# Patient Record
Sex: MALE | Race: BLACK OR AFRICAN AMERICAN | NOT HISPANIC OR LATINO | Employment: FULL TIME | URBAN - METROPOLITAN AREA
[De-identification: names, ages, dates, MRNs, and addresses within clinical notes are randomized per-mention and may not be internally consistent; named-entity substitution may affect disease eponyms.]

---

## 2022-09-26 ENCOUNTER — HOSPITAL ENCOUNTER (EMERGENCY)
Facility: HOSPITAL | Age: 32
Discharge: HOME | End: 2022-09-26
Attending: EMERGENCY MEDICINE
Payer: COMMERCIAL

## 2022-09-26 VITALS
TEMPERATURE: 97 F | BODY MASS INDEX: 37.13 KG/M2 | HEART RATE: 76 BPM | HEIGHT: 68 IN | RESPIRATION RATE: 18 BRPM | DIASTOLIC BLOOD PRESSURE: 92 MMHG | OXYGEN SATURATION: 98 % | SYSTOLIC BLOOD PRESSURE: 139 MMHG | WEIGHT: 245 LBS
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
## 2022-09-26 LAB
ALBUMIN SERPL-MCNC: 4.2 GM/DL (ref 3.5–5)
ALBUMIN/GLOB SERPL: 1.1 RATIO (ref 1.1–2)
ALP SERPL-CCNC: 74 UNIT/L (ref 40–150)
ALT SERPL-CCNC: 28 UNIT/L (ref 0–55)
AST SERPL-CCNC: 22 UNIT/L (ref 5–34)
BASOPHILS # BLD AUTO: 0.06 X10(3)/MCL (ref 0–0.2)
BASOPHILS NFR BLD AUTO: 0.8 %
BILIRUBIN DIRECT+TOT PNL SERPL-MCNC: 0.2 MG/DL
BUN SERPL-MCNC: 13.1 MG/DL (ref 8.9–20.6)
CALCIUM SERPL-MCNC: 10.5 MG/DL (ref 8.4–10.2)
CHLORIDE SERPL-SCNC: 105 MMOL/L (ref 98–107)
CO2 SERPL-SCNC: 27 MMOL/L (ref 22–29)
CREAT SERPL-MCNC: 1.42 MG/DL (ref 0.73–1.18)
EOSINOPHIL # BLD AUTO: 0.12 X10(3)/MCL (ref 0–0.9)
EOSINOPHIL NFR BLD AUTO: 1.6 %
ERYTHROCYTE [DISTWIDTH] IN BLOOD BY AUTOMATED COUNT: 14.9 % (ref 11.5–17)
GFR SERPLBLD CREATININE-BSD FMLA CKD-EPI: >60 MLS/MIN/1.73/M2
GLOBULIN SER-MCNC: 4 GM/DL (ref 2.4–3.5)
GLUCOSE SERPL-MCNC: 101 MG/DL (ref 74–100)
HBV CORE IGM SERPL QL IA: NONREACTIVE
HBV SURFACE AB SER-ACNC: 73.46 MIU/ML
HBV SURFACE AB SERPL IA-ACNC: REACTIVE M[IU]/ML
HCT VFR BLD AUTO: 45.9 % (ref 42–52)
HGB BLD-MCNC: 14.4 GM/DL (ref 14–18)
HIV 1+2 AB+HIV1 P24 AG SERPL QL IA: NONREACTIVE
IMM GRANULOCYTES # BLD AUTO: 0.07 X10(3)/MCL (ref 0–0.04)
IMM GRANULOCYTES NFR BLD AUTO: 0.9 %
LYMPHOCYTES # BLD AUTO: 3.45 X10(3)/MCL (ref 0.6–4.6)
LYMPHOCYTES NFR BLD AUTO: 46.1 %
MCH RBC QN AUTO: 26 PG (ref 27–31)
MCHC RBC AUTO-ENTMCNC: 31.4 MG/DL (ref 33–36)
MCV RBC AUTO: 83 FL (ref 80–94)
MONOCYTES # BLD AUTO: 0.5 X10(3)/MCL (ref 0.1–1.3)
MONOCYTES NFR BLD AUTO: 6.7 %
NEUTROPHILS # BLD AUTO: 3.3 X10(3)/MCL (ref 2.1–9.2)
NEUTROPHILS NFR BLD AUTO: 43.9 %
NRBC BLD AUTO-RTO: 0 %
PLATELET # BLD AUTO: 349 X10(3)/MCL (ref 130–400)
PMV BLD AUTO: 9.5 FL (ref 7.4–10.4)
POTASSIUM SERPL-SCNC: 4.2 MMOL/L (ref 3.5–5.1)
PROT SERPL-MCNC: 8.2 GM/DL (ref 6.4–8.3)
RBC # BLD AUTO: 5.53 X10(6)/MCL (ref 4.7–6.1)
SODIUM SERPL-SCNC: 145 MMOL/L (ref 136–145)
WBC # SPEC AUTO: 7.5 X10(3)/MCL (ref 4.5–11.5)

## 2022-09-26 PROCEDURE — 86803 HEPATITIS C AB TEST: CPT | Performed by: NURSE PRACTITIONER

## 2022-09-26 PROCEDURE — 99284 EMERGENCY DEPT VISIT MOD MDM: CPT | Mod: 25

## 2022-09-26 PROCEDURE — 86709 HEPATITIS A IGM ANTIBODY: CPT | Performed by: NURSE PRACTITIONER

## 2022-09-26 PROCEDURE — 90471 IMMUNIZATION ADMIN: CPT | Performed by: NURSE PRACTITIONER

## 2022-09-26 PROCEDURE — 80053 COMPREHEN METABOLIC PANEL: CPT | Performed by: NURSE PRACTITIONER

## 2022-09-26 PROCEDURE — 85025 COMPLETE CBC W/AUTO DIFF WBC: CPT | Performed by: NURSE PRACTITIONER

## 2022-09-26 PROCEDURE — 87389 HIV-1 AG W/HIV-1&-2 AB AG IA: CPT | Performed by: NURSE PRACTITIONER

## 2022-09-26 PROCEDURE — 63600175 PHARM REV CODE 636 W HCPCS: Performed by: NURSE PRACTITIONER

## 2022-09-26 PROCEDURE — 90715 TDAP VACCINE 7 YRS/> IM: CPT | Performed by: NURSE PRACTITIONER

## 2022-09-26 PROCEDURE — 86706 HEP B SURFACE ANTIBODY: CPT | Performed by: NURSE PRACTITIONER

## 2022-09-26 PROCEDURE — 36415 COLL VENOUS BLD VENIPUNCTURE: CPT | Performed by: NURSE PRACTITIONER

## 2022-09-26 RX ADMIN — TETANUS TOXOID, REDUCED DIPHTHERIA TOXOID AND ACELLULAR PERTUSSIS VACCINE, ADSORBED 0.5 ML: 5; 2.5; 8; 8; 2.5 SUSPENSION INTRAMUSCULAR at 12:09

## 2022-09-29 LAB — HCV AB SERPL QL IA: NONREACTIVE

## 2024-06-26 ENCOUNTER — OFFICE VISIT (OUTPATIENT)
Dept: FAMILY MEDICINE | Facility: CLINIC | Age: 34
End: 2024-06-26
Payer: COMMERCIAL

## 2024-06-26 ENCOUNTER — PATIENT MESSAGE (OUTPATIENT)
Dept: FAMILY MEDICINE | Facility: CLINIC | Age: 34
End: 2024-06-26

## 2024-06-26 VITALS
WEIGHT: 281.69 LBS | OXYGEN SATURATION: 98 % | DIASTOLIC BLOOD PRESSURE: 84 MMHG | RESPIRATION RATE: 18 BRPM | TEMPERATURE: 98 F | SYSTOLIC BLOOD PRESSURE: 121 MMHG | BODY MASS INDEX: 42.69 KG/M2 | HEIGHT: 68 IN | HEART RATE: 75 BPM

## 2024-06-26 DIAGNOSIS — Z00.00 ENCOUNTER FOR WELLNESS EXAMINATION: Primary | ICD-10-CM

## 2024-06-26 DIAGNOSIS — R09.81 NASAL CONGESTION: ICD-10-CM

## 2024-06-26 DIAGNOSIS — G47.10 HYPERSOMNIA: ICD-10-CM

## 2024-06-26 LAB
ALBUMIN SERPL-MCNC: 4 G/DL (ref 3.5–5)
ALBUMIN/GLOB SERPL: 1.1 RATIO (ref 1.1–2)
ALP SERPL-CCNC: 64 UNIT/L (ref 40–150)
ALT SERPL-CCNC: 36 UNIT/L (ref 0–55)
ANION GAP SERPL CALC-SCNC: 8 MEQ/L
AST SERPL-CCNC: 27 UNIT/L (ref 5–34)
BACTERIA #/AREA URNS AUTO: ABNORMAL /HPF
BASOPHILS # BLD AUTO: 0.08 X10(3)/MCL
BASOPHILS NFR BLD AUTO: 1 %
BILIRUB SERPL-MCNC: 0.3 MG/DL
BILIRUB UR QL STRIP.AUTO: NEGATIVE
BUN SERPL-MCNC: 10.6 MG/DL (ref 8.9–20.6)
CALCIUM SERPL-MCNC: 9.9 MG/DL (ref 8.4–10.2)
CHLORIDE SERPL-SCNC: 107 MMOL/L (ref 98–107)
CHOLEST SERPL-MCNC: 252 MG/DL
CHOLEST/HDLC SERPL: 7 {RATIO} (ref 0–5)
CLARITY UR: CLEAR
CO2 SERPL-SCNC: 28 MMOL/L (ref 22–29)
COLOR UR AUTO: ABNORMAL
CREAT SERPL-MCNC: 1.44 MG/DL (ref 0.73–1.18)
CREAT/UREA NIT SERPL: 7
EOSINOPHIL # BLD AUTO: 0.15 X10(3)/MCL (ref 0–0.9)
EOSINOPHIL NFR BLD AUTO: 1.8 %
ERYTHROCYTE [DISTWIDTH] IN BLOOD BY AUTOMATED COUNT: 14.8 % (ref 11.5–17)
EST. AVERAGE GLUCOSE BLD GHB EST-MCNC: 122.6 MG/DL
GFR SERPLBLD CREATININE-BSD FMLA CKD-EPI: >60 ML/MIN/1.73/M2
GLOBULIN SER-MCNC: 3.7 GM/DL (ref 2.4–3.5)
GLUCOSE SERPL-MCNC: 92 MG/DL (ref 74–100)
GLUCOSE UR QL STRIP: NORMAL
HBA1C MFR BLD: 5.9 %
HCT VFR BLD AUTO: 43.1 % (ref 42–52)
HDLC SERPL-MCNC: 38 MG/DL (ref 35–60)
HGB BLD-MCNC: 14.2 G/DL (ref 14–18)
HGB UR QL STRIP: NEGATIVE
HYALINE CASTS #/AREA URNS LPF: ABNORMAL /LPF
IMM GRANULOCYTES # BLD AUTO: 0.15 X10(3)/MCL (ref 0–0.04)
IMM GRANULOCYTES NFR BLD AUTO: 1.8 %
KETONES UR QL STRIP: NEGATIVE
LDLC SERPL CALC-MCNC: 188 MG/DL (ref 50–140)
LEUKOCYTE ESTERASE UR QL STRIP: NEGATIVE
LYMPHOCYTES # BLD AUTO: 3.44 X10(3)/MCL (ref 0.6–4.6)
LYMPHOCYTES NFR BLD AUTO: 40.9 %
MCH RBC QN AUTO: 27.1 PG (ref 27–31)
MCHC RBC AUTO-ENTMCNC: 32.9 G/DL (ref 33–36)
MCV RBC AUTO: 82.3 FL (ref 80–94)
MONOCYTES # BLD AUTO: 0.64 X10(3)/MCL (ref 0.1–1.3)
MONOCYTES NFR BLD AUTO: 7.6 %
MUCOUS THREADS URNS QL MICRO: ABNORMAL /LPF
NEUTROPHILS # BLD AUTO: 3.95 X10(3)/MCL (ref 2.1–9.2)
NEUTROPHILS NFR BLD AUTO: 46.9 %
NITRITE UR QL STRIP: NEGATIVE
NRBC BLD AUTO-RTO: 0 %
PH UR STRIP: 6 [PH]
PLATELET # BLD AUTO: 324 X10(3)/MCL (ref 130–400)
PMV BLD AUTO: 9.7 FL (ref 7.4–10.4)
POTASSIUM SERPL-SCNC: 3.8 MMOL/L (ref 3.5–5.1)
PROT SERPL-MCNC: 7.7 GM/DL (ref 6.4–8.3)
PROT UR QL STRIP: NEGATIVE
RBC # BLD AUTO: 5.24 X10(6)/MCL (ref 4.7–6.1)
RBC #/AREA URNS AUTO: ABNORMAL /HPF
SODIUM SERPL-SCNC: 143 MMOL/L (ref 136–145)
SP GR UR STRIP.AUTO: 1.02 (ref 1–1.03)
SQUAMOUS #/AREA URNS LPF: ABNORMAL /HPF
T4 FREE SERPL-MCNC: 0.86 NG/DL (ref 0.7–1.48)
TRIGL SERPL-MCNC: 128 MG/DL (ref 34–140)
TSH SERPL-ACNC: 1.2 UIU/ML (ref 0.35–4.94)
UROBILINOGEN UR STRIP-ACNC: NORMAL
VLDLC SERPL CALC-MCNC: 26 MG/DL
WBC # BLD AUTO: 8.41 X10(3)/MCL (ref 4.5–11.5)
WBC #/AREA URNS AUTO: ABNORMAL /HPF

## 2024-06-26 PROCEDURE — 80061 LIPID PANEL: CPT

## 2024-06-26 PROCEDURE — 80053 COMPREHEN METABOLIC PANEL: CPT

## 2024-06-26 PROCEDURE — 99385 PREV VISIT NEW AGE 18-39: CPT | Mod: S$PBB,,,

## 2024-06-26 PROCEDURE — 84443 ASSAY THYROID STIM HORMONE: CPT

## 2024-06-26 PROCEDURE — 3008F BODY MASS INDEX DOCD: CPT | Mod: CPTII,,,

## 2024-06-26 PROCEDURE — 99213 OFFICE O/P EST LOW 20 MIN: CPT | Mod: PBBFAC,PN

## 2024-06-26 PROCEDURE — 85025 COMPLETE CBC W/AUTO DIFF WBC: CPT

## 2024-06-26 PROCEDURE — 84439 ASSAY OF FREE THYROXINE: CPT

## 2024-06-26 PROCEDURE — 1160F RVW MEDS BY RX/DR IN RCRD: CPT | Mod: CPTII,,,

## 2024-06-26 PROCEDURE — 1159F MED LIST DOCD IN RCRD: CPT | Mod: CPTII,,,

## 2024-06-26 PROCEDURE — 3079F DIAST BP 80-89 MM HG: CPT | Mod: CPTII,,,

## 2024-06-26 PROCEDURE — 81001 URINALYSIS AUTO W/SCOPE: CPT

## 2024-06-26 PROCEDURE — 36415 COLL VENOUS BLD VENIPUNCTURE: CPT

## 2024-06-26 PROCEDURE — 3074F SYST BP LT 130 MM HG: CPT | Mod: CPTII,,,

## 2024-06-26 PROCEDURE — 83036 HEMOGLOBIN GLYCOSYLATED A1C: CPT

## 2024-06-26 RX ORDER — FLUTICASONE PROPIONATE 50 MCG
1 SPRAY, SUSPENSION (ML) NASAL DAILY
Qty: 18.2 ML | Refills: 3 | Status: SHIPPED | OUTPATIENT
Start: 2024-06-26

## 2024-06-26 NOTE — PROGRESS NOTES
Patient Name: Faraz Sanchez     : 1990    MRN: [id number]     Subjective:     Patient ID: Faraz Sanchez is a 34 y.o. male.    Chief Complaint:   Chief Complaint   Patient presents with    Establish Care     Check up; girlfriend and mom wants him to get checked for diabetes        HPI: 2024:  Since the clinic today to establish care, states that he has not seen any provider in about 20 years.  Patient does not have any chronic conditions currently and does not take any daily medications that need to be refilled.  Patient states his wife and girlfriend want him to get checked for diabetes.  Patient also complains of witnessed apnea and snoring at night.  States his girlfriend tells him that she has to wake him up because he was not breathing.  Patient endorses daytime fatigue, epworth 15.     Patient denies chest pain, palpitations, and shortness of breath.  Patient denies fever, night sweats, chills, nausea, vomiting, diarrhea, constipation, weight loss, and changes in appetite.            ROS:       12 point review of systems conducted, negative except as stated in the history of present illness. See HPI for details.    History:     History reviewed. No pertinent past medical history.     Past Surgical History:   Procedure Laterality Date    ANKLE SURGERY Right 2019       Family History   Problem Relation Name Age of Onset    Hearing loss Mother      Hypertension Mother      Stroke Mother      Diabetes Mother          Social History     Tobacco Use    Smoking status: Never    Smokeless tobacco: Never   Substance and Sexual Activity    Alcohol use: Yes     Comment: ocassionally;socially    Drug use: Never    Sexual activity: Yes     Partners: Female       Current Outpatient Medications   Medication Instructions    fluticasone propionate (FLONASE) 50 mcg, Each Nostril, Daily        Review of patient's allergies indicates:  No Known Allergies    Objective:     Visit Vitals  /84 (BP Location: Right  "arm, Patient Position: Sitting, BP Method: Large (Automatic))   Pulse 75   Temp 98 °F (36.7 °C) (Oral)   Resp 18   Ht 5' 8" (1.727 m)   Wt 127.8 kg (281 lb 11.2 oz)   SpO2 98%   BMI 42.83 kg/m²       Physical Examination:     Physical Exam  Constitutional:       General: He is not in acute distress.     Appearance: Normal appearance. He is not ill-appearing.   HENT:      Nose: Congestion present.   Cardiovascular:      Rate and Rhythm: Normal rate and regular rhythm.      Heart sounds: Normal heart sounds.   Pulmonary:      Effort: Pulmonary effort is normal. No respiratory distress.      Breath sounds: Normal breath sounds.   Musculoskeletal:      Cervical back: Normal range of motion.   Skin:     General: Skin is warm and dry.   Neurological:      Mental Status: He is alert and oriented to person, place, and time.   Psychiatric:         Mood and Affect: Mood normal.         Behavior: Behavior normal.         Lab Results:        Assessment:          ICD-10-CM ICD-9-CM   1. Encounter for wellness examination  Z00.00 V70.0   2. Nasal congestion  R09.81 478.19   3. Hypersomnia  G47.10 780.54        Plan:     1. Encounter for wellness examination  Comments:  Wellness labs ordered for patient today  Orders:  -     TSH  -     T4, Free  -     Hemoglobin A1C  -     Lipid Panel  -     CBC Auto Differential  -     Comprehensive Metabolic Panel  -     Urinalysis, Reflex to Urine Culture    2. Nasal congestion  Comments:  Flonase 50 mcg in each nostril once daily  Orders:  -     fluticasone propionate (FLONASE) 50 mcg/actuation nasal spray; 1 spray (50 mcg total) by Each Nostril route once daily.  Dispense: 18.2 mL; Refill: 3    3. Hypersomnia  Comments:  Referral for sleep study placed today.         No follow-ups on file.    Future Appointments   Date Time Provider Department Center   7/11/2024  1:15 PM [facility]        Kita Sexton NP        "

## 2024-07-11 ENCOUNTER — OFFICE VISIT (OUTPATIENT)
Dept: FAMILY MEDICINE | Facility: CLINIC | Age: 34
End: 2024-07-11
Payer: COMMERCIAL

## 2024-07-11 ENCOUNTER — PATIENT MESSAGE (OUTPATIENT)
Dept: FAMILY MEDICINE | Facility: CLINIC | Age: 34
End: 2024-07-11

## 2024-07-11 DIAGNOSIS — R73.03 PREDIABETES: Primary | ICD-10-CM

## 2024-07-11 DIAGNOSIS — E78.89 LIPIDS ABNORMAL: ICD-10-CM

## 2024-07-11 PROCEDURE — 99499 UNLISTED E&M SERVICE: CPT | Mod: 95,,,

## 2024-07-11 NOTE — ASSESSMENT & PLAN NOTE
The prediabetic range is 5.7 - 6.4. They will need to follow an ADA diet, avoiding soda, simple sweets, and limit rice/pasta/breads/starches.  Maintain healthy weight with a goal BMI less than 30.  Exercise 5 times per week for 30 minutes/day.  We will continue to monitor this lab and will recheck it in the future

## 2024-07-11 NOTE — PROGRESS NOTES
Audio/visual Telehealth Visit     The patient location is:  [place]  The chief complaint leading to consultation is: review labs  Visit type:  Synchronous audio visual  Total time spent with patient: 15 min     Each patient to whom I provide medical services by telemedicine is:  (1) informed of the relationship between the physician and patient and the respective role of any other health care provider with respect to management of the patient; and (2) notified that they may decline to receive medical services by telemedicine and may withdraw from such care at any time. Patient verbally consented to receive this service via audio/visual call.      Patient Name: Faraz Sanchez   : 1990  MRN: [id number]     Subjective:   Patient ID: Faraz Sanchez is a 34 y.o. male.    Chief Complaint: review labs     HPI: 2024: Patient presents to clinic today to review labs. Discussed labs at length with patient and all questions were answered to patients understanding. Patient has no new acute complaints today.  Patient states that he has not heard yet from sleep study referral that was placed at initial office visit, he does have phone number to this referral and will contact them directly.          2024:  Since the clinic today to establish care, states that he has not seen any provider in about 20 years.  Patient does not have any chronic conditions currently and does not take any daily medications that need to be refilled.  Patient states his wife and girlfriend want him to get checked for diabetes.  Patient also complains of witnessed apnea and snoring at night.  States his girlfriend tells him that she has to wake him up because he was not breathing.  Patient endorses daytime fatigue, epworth 15.     Patient denies chest pain, palpitations, and shortness of breath.  Patient denies fever, night sweats, chills, nausea, vomiting, diarrhea, constipation, weight loss, and changes in  appetite.            ROS:    12 point review of systems conducted, negative except as stated in the history of present illness. See HPI for details.      History:   History reviewed. No pertinent past medical history.   Past Surgical History:   Procedure Laterality Date    ANKLE SURGERY Right 2019     Family History   Problem Relation Name Age of Onset    Hearing loss Mother      Hypertension Mother      Stroke Mother      Diabetes Mother        Social History     Tobacco Use    Smoking status: Never    Smokeless tobacco: Never   Substance and Sexual Activity    Alcohol use: Yes     Comment: ocassionally;socially    Drug use: Never    Sexual activity: Yes     Partners: Female        Allergies: Review of patient's allergies indicates:  No Known Allergies  Objective:   There were no vitals filed for this visit.  There is no height or weight on file to calculate BMI.     Physical Examination:   Physical Exam  Constitutional:       General: He is not in acute distress.     Comments: Limited Physical Exam due to telemedicine visit   Pulmonary:      Effort: Pulmonary effort is normal. No respiratory distress.   Neurological:      Mental Status: He is alert.   Psychiatric:         Mood and Affect: Mood normal.         Behavior: Behavior normal.         Assessment:     Problem List Items Addressed This Visit       Prediabetes - Primary    Current Assessment & Plan     The prediabetic range is 5.7 - 6.4. They will need to follow an ADA diet, avoiding soda, simple sweets, and limit rice/pasta/breads/starches.  Maintain healthy weight with a goal BMI less than 30.  Exercise 5 times per week for 30 minutes/day.  We will continue to monitor this lab and will recheck it in the future           Lipids abnormal    Current Assessment & Plan     Stressed importance of dietary modifications. Follow a low cholesterol, low saturated fat diet with less that 200mg of cholesterol a day.  Avoid fried foods and high saturated fats (high  saturated fats less than 7% of calories).  Add Flax Seed/Fish Oil supplements to diet. Increase dietary fiber.  Regular exercise can reduce LDL and raise HDL. Stressed importance of physical activity 5 times per week for 30 minutes per day.                 Plan:   Diagnoses and all orders for this visit:    Prediabetes    Lipids abnormal       Follow up in about 1 year (around 7/11/2025), or if symptoms worsen or fail to improve, for annual wellness exam.        **Due to technological air patient was disconnected from visits, attempted to call patient's cell phone and left a voicemail for him to contact clinic again.**    This note was created with the assistance of a voice recognition software or phone dictation. There may be transcription errors as a result of using this technology however minimal. Effort has been made to assure accuracy of transcription but any obvious errors or omissions should be clarified with the author of the document      This service was not originating from a related E/M service provided within the previous 7 days nor will  to an E/M service or procedure within the next 24 hours or my soonest available appointment.  Prevailing standard of care was able to be met in this audio-visual visit.

## 2025-04-21 NOTE — ED PROVIDER NOTES
Chief Complaint   Patient presents with    Medication Refill     Last Appointment with Dr. Aparna Cortez:  3/13/2025   Future Appointments   Date Time Provider Department Center   6/23/2025 10:00 AM [facility]   9/18/2025  1:00 PM Aparna Cortez MD [facility] DEP       The above orders were approved via [facility] per Dr. Aparna Cortez.     Encounter Date: 9/26/2022       History     Chief Complaint   Patient presents with    Body Fluid Exposure     Employee needle stick exposure, to the left palm after he states there were needles in the wrong bin.      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 States that he washed the area immediately.     Review of patient's allergies indicates:  No Known Allergies  No past medical history on file.  No past surgical history on file.  No family history on file.  Social History     Tobacco Use    Smoking status: Never    Smokeless tobacco: Never     Review of Systems   Constitutional: Negative.    HENT: Negative.     Eyes: Negative.    Respiratory: Negative.     Cardiovascular: Negative.    Gastrointestinal: Negative.    Endocrine: Negative.    Genitourinary: Negative.    Musculoskeletal: Negative.    Skin: Negative.         Needlestick    Allergic/Immunologic: Negative.    Neurological: Negative.    Hematological: Negative.    Psychiatric/Behavioral: Negative.     All other systems reviewed and are negative.    Physical Exam     Initial Vitals [09/26/22 1220]   BP Pulse Resp Temp SpO2   (!) 139/92 76 18 97.3 °F (36.3 °C) 98 %      MAP       --         Physical Exam    Nursing note and vitals reviewed.  Constitutional: He appears well-developed and well-nourished. No distress.   HENT:   Head: Normocephalic and atraumatic.   Mouth/Throat: Oropharynx is clear and moist.   Eyes: Conjunctivae and EOM are normal. Pupils are equal, round, and reactive to light.   Neck: Neck supple.   Normal range of motion.  Cardiovascular:  Normal rate, regular rhythm, normal heart sounds and intact distal pulses.           Pulmonary/Chest: Breath sounds normal. No respiratory distress.   Musculoskeletal:         General: No edema. Normal range of motion.      Cervical back: Normal range of motion and neck supple.     Neurological: He is alert and oriented to person,  place, and time. He has normal strength. GCS score is 15. GCS eye subscore is 4. GCS verbal subscore is 5. GCS motor subscore is 6.   Skin: Skin is warm and dry. No rash noted.   Left lateral palm with small needle puncture wound. There is no bleeding or drainage noted.    Psychiatric: He has a normal mood and affect. Thought content normal.       ED Course   Procedures  Labs Reviewed   HIV 1 / 2 ANTIBODY   HEPATITIS B SURFACE ANTIBODY   HEPATITIS B CORE ANTIBODY, IGM   HEPATITIS C ANTIBODY   CBC W/ AUTO DIFFERENTIAL    Narrative:     The following orders were created for panel order CBC auto differential.  Procedure                               Abnormality         Status                     ---------                               -----------         ------                     CBC with Differential[[id number]]                                                         Please view results for these tests on the individual orders.   COMPREHENSIVE METABOLIC PANEL   CBC WITH DIFFERENTIAL          Imaging Results    None          Medications   Tdap (BOOSTRIX) vaccine injection 0.5 mL (has no administration in time range)     Medical Decision Making:   Initial Assessment:   Patient is awake, alert, neurovascular intact, and ambulatory in the ED.   Differential Diagnosis:   Needlestick, Exposure to body fluids  Clinical Tests:   Lab Tests: Ordered  ED Management:  Offered HIV prophylaxis to patient and discussed risks vs. Benefits of initiating prophylaxis and patient declined initiating treatment at this time.                           Clinical Impression:   Final diagnoses:  [W46.1XXA] Needlestick injury accident with exposure to body fluid (Primary)        ED Disposition Condition    Discharge Stable          ED Prescriptions    None       Follow-up Information       Follow up With Specialties Details Why Contact Info    Primary Care Provider  In 3 days      [facility]  In 3 days               Karina HARRISON  Leonardo, [facility]  09/26/22 1240

## 2025-06-09 ENCOUNTER — OFFICE VISIT (OUTPATIENT)
Dept: FAMILY MEDICINE | Facility: CLINIC | Age: 35
End: 2025-06-09
Payer: COMMERCIAL

## 2025-06-09 VITALS
TEMPERATURE: 98 F | DIASTOLIC BLOOD PRESSURE: 88 MMHG | OXYGEN SATURATION: 98 % | HEIGHT: 68 IN | RESPIRATION RATE: 18 BRPM | BODY MASS INDEX: 42.54 KG/M2 | HEART RATE: 75 BPM | SYSTOLIC BLOOD PRESSURE: 130 MMHG | WEIGHT: 280.69 LBS

## 2025-06-09 DIAGNOSIS — E78.89 LIPIDS ABNORMAL: ICD-10-CM

## 2025-06-09 DIAGNOSIS — E66.01 CLASS 3 SEVERE OBESITY DUE TO EXCESS CALORIES WITHOUT SERIOUS COMORBIDITY WITH BODY MASS INDEX (BMI) OF 40.0 TO 44.9 IN ADULT: ICD-10-CM

## 2025-06-09 DIAGNOSIS — Z00.00 WELL ADULT EXAM: Primary | ICD-10-CM

## 2025-06-09 DIAGNOSIS — E66.813 CLASS 3 SEVERE OBESITY DUE TO EXCESS CALORIES WITHOUT SERIOUS COMORBIDITY WITH BODY MASS INDEX (BMI) OF 40.0 TO 44.9 IN ADULT: ICD-10-CM

## 2025-06-09 DIAGNOSIS — R73.03 PREDIABETES: ICD-10-CM

## 2025-06-09 LAB
25(OH)D3+25(OH)D2 SERPL-MCNC: 20 NG/ML (ref 30–80)
ALBUMIN SERPL-MCNC: 4 G/DL (ref 3.5–5)
ALBUMIN/GLOB SERPL: 1 RATIO (ref 1.1–2)
ALP SERPL-CCNC: 73 UNIT/L (ref 40–150)
ALT SERPL-CCNC: 25 UNIT/L (ref 0–55)
ANION GAP SERPL CALC-SCNC: 8 MEQ/L
AST SERPL-CCNC: 20 UNIT/L (ref 11–45)
BACTERIA #/AREA URNS AUTO: ABNORMAL /HPF
BASOPHILS # BLD AUTO: 0.09 X10(3)/MCL
BASOPHILS NFR BLD AUTO: 1 %
BILIRUB SERPL-MCNC: 0.3 MG/DL
BILIRUB UR QL STRIP.AUTO: NEGATIVE
BUN SERPL-MCNC: 13.8 MG/DL (ref 8.9–20.6)
CALCIUM SERPL-MCNC: 9.8 MG/DL (ref 8.4–10.2)
CHLORIDE SERPL-SCNC: 107 MMOL/L (ref 98–107)
CHOLEST SERPL-MCNC: 266 MG/DL
CHOLEST/HDLC SERPL: 7 {RATIO} (ref 0–5)
CLARITY UR: CLEAR
CO2 SERPL-SCNC: 26 MMOL/L (ref 22–29)
COLOR UR AUTO: ABNORMAL
CREAT SERPL-MCNC: 1.26 MG/DL (ref 0.72–1.25)
CREAT/UREA NIT SERPL: 11
EOSINOPHIL # BLD AUTO: 0.08 X10(3)/MCL (ref 0–0.9)
EOSINOPHIL NFR BLD AUTO: 0.9 %
ERYTHROCYTE [DISTWIDTH] IN BLOOD BY AUTOMATED COUNT: 14.6 % (ref 11.5–17)
EST. AVERAGE GLUCOSE BLD GHB EST-MCNC: 125.5 MG/DL
GFR SERPLBLD CREATININE-BSD FMLA CKD-EPI: >60 ML/MIN/1.73/M2
GLOBULIN SER-MCNC: 4.1 GM/DL (ref 2.4–3.5)
GLUCOSE SERPL-MCNC: 86 MG/DL (ref 74–100)
GLUCOSE UR QL STRIP: NORMAL
HBA1C MFR BLD: 6 %
HCT VFR BLD AUTO: 45.4 % (ref 42–52)
HDLC SERPL-MCNC: 37 MG/DL (ref 35–60)
HGB BLD-MCNC: 14.4 G/DL (ref 14–18)
HGB UR QL STRIP: ABNORMAL
HYALINE CASTS #/AREA URNS LPF: ABNORMAL /LPF
IMM GRANULOCYTES # BLD AUTO: 0.17 X10(3)/MCL (ref 0–0.04)
IMM GRANULOCYTES NFR BLD AUTO: 1.9 %
KETONES UR QL STRIP: NEGATIVE
LDLC SERPL CALC-MCNC: 190 MG/DL (ref 50–140)
LEUKOCYTE ESTERASE UR QL STRIP: NEGATIVE
LYMPHOCYTES # BLD AUTO: 3.45 X10(3)/MCL (ref 0.6–4.6)
LYMPHOCYTES NFR BLD AUTO: 39.3 %
MCH RBC QN AUTO: 25.8 PG (ref 27–31)
MCHC RBC AUTO-ENTMCNC: 31.7 G/DL (ref 33–36)
MCV RBC AUTO: 81.2 FL (ref 80–94)
MONOCYTES # BLD AUTO: 0.8 X10(3)/MCL (ref 0.1–1.3)
MONOCYTES NFR BLD AUTO: 9.1 %
MUCOUS THREADS URNS QL MICRO: ABNORMAL /LPF
NEUTROPHILS # BLD AUTO: 4.18 X10(3)/MCL (ref 2.1–9.2)
NEUTROPHILS NFR BLD AUTO: 47.8 %
NITRITE UR QL STRIP: NEGATIVE
NRBC BLD AUTO-RTO: 0 %
PH UR STRIP: 6 [PH]
PLATELET # BLD AUTO: 335 X10(3)/MCL (ref 130–400)
PMV BLD AUTO: 9.5 FL (ref 7.4–10.4)
POTASSIUM SERPL-SCNC: 4.2 MMOL/L (ref 3.5–5.1)
PROT SERPL-MCNC: 8.1 GM/DL (ref 6.4–8.3)
PROT UR QL STRIP: NEGATIVE
RBC # BLD AUTO: 5.59 X10(6)/MCL (ref 4.7–6.1)
RBC #/AREA URNS AUTO: ABNORMAL /HPF
SODIUM SERPL-SCNC: 141 MMOL/L (ref 136–145)
SP GR UR STRIP.AUTO: 1.02 (ref 1–1.03)
SQUAMOUS #/AREA URNS LPF: ABNORMAL /HPF
TRIGL SERPL-MCNC: 195 MG/DL (ref 34–140)
TSH SERPL-ACNC: 1.83 UIU/ML (ref 0.35–4.94)
UROBILINOGEN UR STRIP-ACNC: NORMAL
VLDLC SERPL CALC-MCNC: 39 MG/DL
WBC # BLD AUTO: 8.77 X10(3)/MCL (ref 4.5–11.5)
WBC #/AREA URNS AUTO: ABNORMAL /HPF

## 2025-06-09 PROCEDURE — 83036 HEMOGLOBIN GLYCOSYLATED A1C: CPT | Performed by: NURSE PRACTITIONER

## 2025-06-09 PROCEDURE — 80053 COMPREHEN METABOLIC PANEL: CPT | Performed by: NURSE PRACTITIONER

## 2025-06-09 PROCEDURE — 99214 OFFICE O/P EST MOD 30 MIN: CPT | Mod: PBBFAC,PN | Performed by: NURSE PRACTITIONER

## 2025-06-09 PROCEDURE — 82306 VITAMIN D 25 HYDROXY: CPT | Performed by: NURSE PRACTITIONER

## 2025-06-09 PROCEDURE — 81001 URINALYSIS AUTO W/SCOPE: CPT | Performed by: NURSE PRACTITIONER

## 2025-06-09 PROCEDURE — 84443 ASSAY THYROID STIM HORMONE: CPT | Performed by: NURSE PRACTITIONER

## 2025-06-09 PROCEDURE — 85025 COMPLETE CBC W/AUTO DIFF WBC: CPT | Performed by: NURSE PRACTITIONER

## 2025-06-09 PROCEDURE — 80061 LIPID PANEL: CPT | Performed by: NURSE PRACTITIONER

## 2025-06-09 NOTE — ASSESSMENT & PLAN NOTE
- Not at goal  - Advised to implement a heart-healthy diet emphasizing fruits, vegetables, whole grains, poultry, fish, nuts, and nontropical vegetable oils, while limiting red and processed meats, sodium, and sugar-sweetened foods and beverages. Avoid fried foods and high saturated fats (jackson, sausage, cookies, cakes, chips, cheese, whole milk, butter, mayonnaise, creamy dressings, gravy, stew, gumbo, boudin, cracklins and cream sauces).  - Add flax seed or fish oil supplements to diet.   - Increase dietary fiber.   - Regular exercise improves cholesterol levels.  - Physical activity 5 times a week for 30 minutes per day (or 150 minutes per week).

## 2025-06-09 NOTE — ASSESSMENT & PLAN NOTE
- The prediabetic range is 5.7 - 6.4.   - They will need to follow an ADA diet, avoiding soda, simple sweets, and limit rice/pasta/breads/starches.    - Maintain healthy weight with a goal BMI less than 30.    - Exercise 5 times per week for 30 minutes/day.    - We will continue to monitor this lab and will recheck it in the future

## 2025-06-09 NOTE — ASSESSMENT & PLAN NOTE
- Pt wellness visit completed today with appropriate lab work.   - HM Topics Reviewed / Updated  - Immunizations Discussed  - Dicussed Healthy Diet  - Encouraged to exercise 3 x weekly  - Increase Water Intake  - Eat more fruits and vegetables  - Avoid soda & alcohol  - PHQ score:  0

## 2025-06-09 NOTE — PROGRESS NOTES
[facility]    Patient ID: [id number]     Chief Complaint: Follow-up (F/u appointment. States utilizes a sleep apnea mouth piece and is effective. )      HPI:   HPI    Faraz Sanchez is a 35 y.o. male here today for Follow-up (F/u appointment. States utilizes a sleep apnea mouth piece and is effective. )      History of Present Illness    06/09/2025: Patient presents to clinic today to establish care with new provider in clinic and wellness visit. Current medical problems include prediabetes and abnormal lipids.  Patient does work out and attempts to follow a healthy diet.  We will repeat levels today.  Patient did complete screening for sleep apnea and was told that a mouth piece should be effective.  He is wearing it and feels that it does help.         Health Maintenance         Date Due Completion Date    COVID-19 Vaccine (3 - 2024-25 season) 09/01/2024 10/24/2021    Hemoglobin A1c (Prediabetes) 06/26/2025 6/26/2024    Lipid Panel 06/26/2029 6/26/2024    TETANUS VACCINE 09/26/2032 9/26/2022    RSV Vaccine (Age 60+ and Pregnant patients) (1 - 1-dose 75+ series) 04/07/2063 ---            History reviewed. No pertinent past medical history.     Past Surgical History:   Procedure Laterality Date    ANKLE SURGERY Right 2019        Social History     Tobacco Use    Smoking status: Never    Smokeless tobacco: Never   Substance and Sexual Activity    Alcohol use: Yes     Comment: ocassionally;socially    Drug use: Never    Sexual activity: Yes     Partners: Female        No current outpatient medications      Review of patient's allergies indicates:   Allergen Reactions    Strawberries [strawberry] Anaphylaxis and Swelling        Patient Care Team:  Leona Coyne FNP as PCP - General (Family Medicine)     Subjective:     Review of Systems   Constitutional:  Negative for appetite change, chills, diaphoresis and fever.   HENT:  Negative for ear pain, sinus pain and sore throat.    Eyes:  Negative for pain and  "visual disturbance.   Respiratory:  Negative for cough, shortness of breath and wheezing.    Cardiovascular:  Negative for chest pain, palpitations and leg swelling.   Gastrointestinal:  Negative for abdominal pain, blood in stool, diarrhea, nausea and vomiting.   Endocrine: Negative for cold intolerance.   Genitourinary:  Negative for difficulty urinating, dysuria, frequency and hematuria.   Musculoskeletal:  Negative for arthralgias, joint swelling and myalgias.   Skin:  Negative for color change and rash.   Allergic/Immunologic: Negative.    Neurological: Negative.  Negative for dizziness, syncope, light-headedness and numbness.   Hematological: Negative.    Psychiatric/Behavioral: Negative.  Negative for dysphoric mood and suicidal ideas. The patient is not nervous/anxious.    All other systems reviewed and are negative.      12 point review of systems conducted, negative except as stated in the history of present illness. See HPI for details.    Objective:     Visit Vitals  /88 (BP Location: Right arm, Patient Position: Sitting)   Pulse 75   Temp 98.3 °F (36.8 °C) (Oral)   Resp 18   Ht 5' 8" (1.727 m)   Wt 127.3 kg (280 lb 11.2 oz)   SpO2 98%   BMI 42.68 kg/m²       Physical Exam  Vitals and nursing note reviewed.   Constitutional:       General: He is not in acute distress.     Appearance: He is obese. He is not ill-appearing.   HENT:      Head: Normocephalic and atraumatic.      Mouth/Throat:      Mouth: Mucous membranes are moist.      Pharynx: Oropharynx is clear.   Eyes:      General: No scleral icterus.     Extraocular Movements: Extraocular movements intact.      Conjunctiva/sclera: Conjunctivae normal.      Pupils: Pupils are equal, round, and reactive to light.   Neck:      Vascular: No carotid bruit.   Cardiovascular:      Rate and Rhythm: Normal rate and regular rhythm.      Heart sounds: No murmur heard.     No friction rub. No gallop.   Pulmonary:      Effort: Pulmonary effort is normal. No " respiratory distress.      Breath sounds: Normal breath sounds. No wheezing, rhonchi or rales.   Abdominal:      General: Abdomen is flat. Bowel sounds are normal. There is no distension.      Palpations: Abdomen is soft. There is no mass.      Tenderness: There is no abdominal tenderness.   Musculoskeletal:         General: Normal range of motion.      Cervical back: Normal range of motion and neck supple.   Skin:     General: Skin is warm and dry.   Neurological:      General: No focal deficit present.      Mental Status: He is alert.   Psychiatric:         Mood and Affect: Mood normal.         Labs Reviewed:     Chemistry:  Lab Results   Component Value Date     06/26/2024    K 3.8 06/26/2024    BUN 10.6 06/26/2024    CREATININE 1.44 (H) 06/26/2024    EGFRNORACEVR >60 06/26/2024    CALCIUM 9.9 06/26/2024    ALKPHOS 64 06/26/2024    ALBUMIN 4.0 06/26/2024    AST 27 06/26/2024    ALT 36 06/26/2024    TSH 1.197 06/26/2024    NIMKJA4DJBT 0.86 06/26/2024        Lab Results   Component Value Date    HGBA1C 5.9 06/26/2024        Hematology:  Lab Results   Component Value Date    WBC 8.41 06/26/2024    HGB 14.2 06/26/2024    HCT 43.1 06/26/2024     06/26/2024       Lipid Panel:  Lab Results   Component Value Date    CHOL 252 (H) 06/26/2024    HDL 38 06/26/2024    .00 (H) 06/26/2024    TRIG 128 06/26/2024    TOTALCHOLEST 7 (H) 06/26/2024        Urine:  Lab Results   Component Value Date    APPEARANCEUA Clear 06/26/2024    SGUA 1.020 06/26/2024    PROTEINUA Negative 06/26/2024    KETONESUA Negative 06/26/2024    LEUKOCYTESUR Negative 06/26/2024    RBCUA 0-5 06/26/2024    WBCUA 0-5 06/26/2024    BACTERIA None Seen 06/26/2024    SQEPUA None Seen 06/26/2024    HYALINECASTS None Seen 06/26/2024        Assessment:       ICD-10-CM ICD-9-CM   1. Well adult exam  Z00.00 V70.0   2. Prediabetes  R73.03 790.29   3. Lipids abnormal  E78.89 272.9   4. Class 3 severe obesity due to excess calories without serious  comorbidity with body mass index (BMI) of 40.0 to 44.9 in adult  E66.813 278.01    E66.01 V85.41    Z68.41         Plan:     1. Well adult exam  Assessment & Plan:  - Pt wellness visit completed today with appropriate lab work.   -  Topics Reviewed / Updated  - Immunizations Discussed  - Dicussed Healthy Diet  - Encouraged to exercise 3 x weekly  - Increase Water Intake  - Eat more fruits and vegetables  - Avoid soda & alcohol  - PHQ score:  0    Orders:  -     CBC Auto Differential  -     Comprehensive Metabolic Panel  -     Lipid Panel  -     TSH  -     Hemoglobin A1C  -     Urinalysis  -     Vitamin D    2. Prediabetes  Overview:  Lab Results   Component Value Date    HGBA1C 5.9 06/26/2024       Assessment & Plan:  - The prediabetic range is 5.7 - 6.4.   - They will need to follow an ADA diet, avoiding soda, simple sweets, and limit rice/pasta/breads/starches.    - Maintain healthy weight with a goal BMI less than 30.    - Exercise 5 times per week for 30 minutes/day.    - We will continue to monitor this lab and will recheck it in the future    Orders:  -     Comprehensive Metabolic Panel  -     Hemoglobin A1C    3. Lipids abnormal  Assessment & Plan:  - Not at goal  - Advised to implement a heart-healthy diet emphasizing fruits, vegetables, whole grains, poultry, fish, nuts, and nontropical vegetable oils, while limiting red and processed meats, sodium, and sugar-sweetened foods and beverages. Avoid fried foods and high saturated fats (jackson, sausage, cookies, cakes, chips, cheese, whole milk, butter, mayonnaise, creamy dressings, gravy, stew, gumbo, boudin, cracklins and cream sauces).  - Add flax seed or fish oil supplements to diet.   - Increase dietary fiber.   - Regular exercise improves cholesterol levels.  - Physical activity 5 times a week for 30 minutes per day (or 150 minutes per week).     Orders:  -     Comprehensive Metabolic Panel  -     Lipid Panel    4. Class 3 severe obesity due to excess  calories without serious comorbidity with body mass index (BMI) of 40.0 to 44.9 in adult  Assessment & Plan:  - Body mass index is 42.68 kg/m².  - Exercise (30min/day for 5days a week)   - Follow a balanced low-calorie, low-fat/low-calorie, moderate-fat/low-calorie, or low-carbohydrate diets  - Obesity is a serious, chronic, and progressive disease and is associated with a significant increase in mortality and many health risks including type 2 diabetes mellitus, hypertension, dyslipidemia, and coronary heart disease. The benefits of weight loss include a reduction in the rate of progression from impaired glucose tolerance to diabetes, blood pressure in hypertensive patients, and lipid levels in higher risk patients. Other noncardiac benefits of weight loss includes reductions in urinary incontinence, sleep apnea, and depression, as well as improvements in quality of life, physical functioning, and mobility.           Follow up in about 2 weeks (around 6/23/2025) for Follow up, Virtual Visit lab results. In addition to their scheduled follow up, the patient has also been instructed to follow up on as needed basis.     Future Appointments   Date Time Provider Department Center   6/27/2025 10:00 AM Leona Coyne FNP [facility]           STEVEN NINA

## 2025-06-09 NOTE — ASSESSMENT & PLAN NOTE
- Body mass index is 42.68 kg/m².  - Exercise (30min/day for 5days a week)   - Follow a balanced low-calorie, low-fat/low-calorie, moderate-fat/low-calorie, or low-carbohydrate diets  - Obesity is a serious, chronic, and progressive disease and is associated with a significant increase in mortality and many health risks including type 2 diabetes mellitus, hypertension, dyslipidemia, and coronary heart disease. The benefits of weight loss include a reduction in the rate of progression from impaired glucose tolerance to diabetes, blood pressure in hypertensive patients, and lipid levels in higher risk patients. Other noncardiac benefits of weight loss includes reductions in urinary incontinence, sleep apnea, and depression, as well as improvements in quality of life, physical functioning, and mobility.

## 2025-06-10 ENCOUNTER — RESULTS FOLLOW-UP (OUTPATIENT)
Dept: FAMILY MEDICINE | Facility: CLINIC | Age: 35
End: 2025-06-10

## 2025-06-10 DIAGNOSIS — E55.9 VITAMIN D DEFICIENCY: Primary | ICD-10-CM

## 2025-06-10 RX ORDER — ASPIRIN 325 MG
50000 TABLET, DELAYED RELEASE (ENTERIC COATED) ORAL
Qty: 12 CAPSULE | Refills: 0 | Status: SHIPPED | OUTPATIENT
Start: 2025-06-10

## 2025-06-27 ENCOUNTER — OFFICE VISIT (OUTPATIENT)
Dept: FAMILY MEDICINE | Facility: CLINIC | Age: 35
End: 2025-06-27
Payer: COMMERCIAL

## 2025-06-27 DIAGNOSIS — E78.2 MIXED HYPERLIPIDEMIA: Primary | ICD-10-CM

## 2025-06-27 DIAGNOSIS — R73.03 PREDIABETES: ICD-10-CM

## 2025-06-27 PROBLEM — E78.89 LIPIDS ABNORMAL: Status: RESOLVED | Noted: 2024-07-11 | Resolved: 2025-06-27

## 2025-06-27 RX ORDER — ROSUVASTATIN CALCIUM 5 MG/1
5 TABLET, COATED ORAL DAILY
Qty: 90 TABLET | Refills: 1 | Status: SHIPPED | OUTPATIENT
Start: 2025-06-27

## 2025-06-27 NOTE — PROGRESS NOTES
[facility]        Patient ID: [id number]     Chief Complaint: Follow-up      HPI:     This is a telemedicine note. Patient was treated using telemedicine, real time audio and video, according to [facility] protocols. Leona FUENTES FNP-C, conducted the visit from the [facility]. The patient participated in the visit at a non-[facility] location selected by the patient, identified below. I am licensed in the state where the patient stated they are located. The patient stated that they understood and accepted the privacy and security risks to their information at their location. This visit is not recorded.    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      Faraz Sanchez is a 35 y.o. male here today for a telemedicine visit.   History of Present Illness    06/09/2025: Patient presents to clinic today to establish care with new provider in clinic and wellness visit. Current medical problems include prediabetes and abnormal lipids.  Patient does work out and attempts to follow a healthy diet.  We will repeat levels today.  Patient did complete screening for sleep apnea and was told that a mouth piece should be effective.  He is wearing it and feels that it does help.     06/27/2025: Patient presents to clinic today for follow up regarding lab results.  Lipid panel remains elevated despite lifestyle changes.  Shared decision made to start rosuvastatin 5 mg daily omega-3 2 g daily.  Patient's A1c also remains elevated in prediabetic range.  Stressed ADA diet.  Patient does have family history of diabetes. Patient was started on vitamin-D weekly supplement prior to appointment.      Health Maintenance         Date Due Completion Date    High Dose Statin Never done ---    COVID-19 Vaccine (3 - 2024-25 season) 09/01/2024 10/24/2021    Hemoglobin A1c (Prediabetes) 06/09/2026 6/9/2025    Lipid Panel 06/09/2030 6/9/2025    TETANUS VACCINE 09/26/2032 9/26/2022    RSV Vaccine (Age 60+ and Pregnant  patients) (1 - 1-dose 75+ series) 04/07/2063 ---            History reviewed. No pertinent past medical history.     Past Surgical History:   Procedure Laterality Date    ANKLE SURGERY Right 2019        Social History     Tobacco Use    Smoking status: Never    Smokeless tobacco: Never   Substance and Sexual Activity    Alcohol use: Yes     Comment: ocassionally;socially    Drug use: Never    Sexual activity: Yes     Partners: Female        Current Outpatient Medications   Medication Instructions    cholecalciferol (vitamin D3) 50,000 Units, Oral, Every 7 days    rosuvastatin (CRESTOR) 5 mg, Oral, Daily       Review of patient's allergies indicates:   Allergen Reactions    Strawberries [strawberry] Anaphylaxis and Swelling        Patient Care Team:  Leona Coyne FNP as PCP - General (Family Medicine)     Subjective:     Review of Systems   Constitutional:  Negative for appetite change, chills, diaphoresis and fever.   HENT:  Negative for ear pain, sinus pain and sore throat.    Eyes:  Negative for pain and visual disturbance.   Respiratory:  Negative for cough, shortness of breath and wheezing.    Cardiovascular:  Negative for chest pain, palpitations and leg swelling.   Gastrointestinal:  Negative for abdominal pain, blood in stool, diarrhea, nausea and vomiting.   Endocrine: Negative for cold intolerance.   Genitourinary:  Negative for difficulty urinating, dysuria, frequency and hematuria.   Musculoskeletal:  Negative for arthralgias, joint swelling and myalgias.   Skin:  Negative for color change and rash.   Allergic/Immunologic: Negative.    Neurological: Negative.  Negative for dizziness, syncope, light-headedness and numbness.   Hematological: Negative.    Psychiatric/Behavioral: Negative.  Negative for dysphoric mood and suicidal ideas. The patient is not nervous/anxious.    All other systems reviewed and are negative.      12 point review of systems conducted, negative except as stated in the history of  present illness. See HPI for details.    Objective:     There were no vitals taken for this visit.    Physical Exam  Constitutional:       Appearance: Normal appearance.   Eyes:      Extraocular Movements: Extraocular movements intact.   Pulmonary:      Effort: Pulmonary effort is normal.   Neurological:      General: No focal deficit present.      Mental Status: He is alert. Mental status is at baseline.   Psychiatric:         Mood and Affect: Mood normal.           Assessment:       ICD-10-CM ICD-9-CM   1. Mixed hyperlipidemia  E78.2 272.2   2. Prediabetes  R73.03 790.29        Labs Reviewed:     Chemistry:  Lab Results   Component Value Date     06/09/2025    K 4.2 06/09/2025    BUN 13.8 06/09/2025    CREATININE 1.26 (H) 06/09/2025    EGFRNORACEVR >60 06/09/2025    CALCIUM 9.8 06/09/2025    ALKPHOS 73 06/09/2025    ALBUMIN 4.0 06/09/2025    AST 20 06/09/2025    ALT 25 06/09/2025    DEAFEUKB90AS 20 (L) 06/09/2025    TSH 1.835 06/09/2025    OGIUIY4UBMB 0.86 06/26/2024        Lab Results   Component Value Date    HGBA1C 6.0 06/09/2025        Hematology:  Lab Results   Component Value Date    WBC 8.77 06/09/2025    HGB 14.4 06/09/2025    HCT 45.4 06/09/2025     06/09/2025       Lipid Panel:  Lab Results   Component Value Date    CHOL 266 (H) 06/09/2025    HDL 37 06/09/2025    .00 (H) 06/09/2025    TRIG 195 (H) 06/09/2025    TOTALCHOLEST 7 (H) 06/09/2025        Urine:  Lab Results   Component Value Date    APPEARANCEUA Clear 06/09/2025    SGUA 1.022 06/09/2025    PROTEINUA Negative 06/09/2025    KETONESUA Negative 06/09/2025    LEUKOCYTESUR Negative 06/09/2025    RBCUA 0-5 06/09/2025    WBCUA 0-5 06/09/2025    BACTERIA None Seen 06/09/2025    SQEPUA Trace (A) 06/09/2025    HYALINECASTS None Seen 06/09/2025        Plan:     1. Mixed hyperlipidemia  Assessment & Plan:  - Uncontrolled,chronic  - Start Crestor (rosuvastatin) 5mg daily and omega 3 2g daily OTC  - slow increase to rosuvastatin dosage to  decrease possibility of myalgia.  - Advised to implement a heart-healthy diet emphasizing fruits, vegetables, whole grains, poultry, fish, nuts, and nontropical vegetable oils, while limiting red and processed meats, sodium, and sugar-sweetened foods and beverages. Avoid fried foods and high saturated fats (jackson, sausage, cookies, cakes, chips, cheese, whole milk, butter, mayonnaise, creamy dressings, gravy, stew, gumbo, boudin, cracklins and cream sauces).  - Increase dietary fiber.   - Regular exercise improves cholesterol levels.  - Physical activity 5 times a week for 30 minutes per day (or 150 minutes per week).     Orders:  -     Comprehensive Metabolic Panel; Future; Expected date: 09/08/2025  -     Lipid Panel; Future; Expected date: 09/08/2025  -     rosuvastatin (CRESTOR) 5 MG tablet; Take 1 tablet (5 mg total) by mouth once daily.  Dispense: 90 tablet; Refill: 1    2. Prediabetes  Overview:  Lab Results   Component Value Date    HGBA1C 6.0 06/09/2025    HGBA1C 5.9 06/26/2024       Assessment & Plan:  - A1c remains in prediabetic range  - Follow an ADA diet, avoiding soda, simple sweets, and limit rice/pasta/breads/starches.    - Maintain healthy weight with a goal BMI less than 30.    - Exercise 5 times per week for 30 minutes/day.    - We will continue to monitor this lab and will recheck it in the future      Follow up in about 3 months (around 9/27/2025). In addition to their scheduled follow up, the patient has also been instructed to follow up on as needed basis.     No future appointments.     Video Time Documentation:  This encounter lasted 20 minutes. More than 50% of this time was spent in counseling and coordination of care. This includes face to face time and non-face to face time preparing to see the patient (eg, review of tests), obtaining and/or reviewing separately obtained history, documenting clinical information in the electronic or other health record, independently interpreting  results and communicating results to the patient/family/caregiver, or care coordinator.        STEVEN NINA

## 2025-06-27 NOTE — ASSESSMENT & PLAN NOTE
- Uncontrolled,chronic  - Start Crestor (rosuvastatin) 5mg daily and omega 3 2g daily OTC  - slow increase to rosuvastatin dosage to decrease possibility of myalgia.  - Advised to implement a heart-healthy diet emphasizing fruits, vegetables, whole grains, poultry, fish, nuts, and nontropical vegetable oils, while limiting red and processed meats, sodium, and sugar-sweetened foods and beverages. Avoid fried foods and high saturated fats (jackson, sausage, cookies, cakes, chips, cheese, whole milk, butter, mayonnaise, creamy dressings, gravy, stew, gumbo, boudin, cracklins and cream sauces).  - Increase dietary fiber.   - Regular exercise improves cholesterol levels.  - Physical activity 5 times a week for 30 minutes per day (or 150 minutes per week).

## 2025-06-27 NOTE — ASSESSMENT & PLAN NOTE
- A1c remains in prediabetic range  - Follow an ADA diet, avoiding soda, simple sweets, and limit rice/pasta/breads/starches.    - Maintain healthy weight with a goal BMI less than 30.    - Exercise 5 times per week for 30 minutes/day.    - We will continue to monitor this lab and will recheck it in the future